# Patient Record
Sex: FEMALE | Race: WHITE | NOT HISPANIC OR LATINO | Employment: FULL TIME | URBAN - NONMETROPOLITAN AREA
[De-identification: names, ages, dates, MRNs, and addresses within clinical notes are randomized per-mention and may not be internally consistent; named-entity substitution may affect disease eponyms.]

---

## 2017-04-07 RX ORDER — DESVENLAFAXINE SUCCINATE 50 MG/1
TABLET, EXTENDED RELEASE ORAL
Qty: 30 TABLET | Refills: 0 | Status: SHIPPED | OUTPATIENT
Start: 2017-04-07 | End: 2017-05-31 | Stop reason: SDUPTHER

## 2017-05-16 RX ORDER — DESVENLAFAXINE SUCCINATE 50 MG/1
TABLET, EXTENDED RELEASE ORAL
Qty: 30 TABLET | Refills: 0 | OUTPATIENT
Start: 2017-05-16

## 2017-05-31 ENCOUNTER — OFFICE VISIT (OUTPATIENT)
Dept: OBSTETRICS AND GYNECOLOGY | Facility: CLINIC | Age: 57
End: 2017-05-31

## 2017-05-31 VITALS
HEIGHT: 69 IN | WEIGHT: 204 LBS | DIASTOLIC BLOOD PRESSURE: 91 MMHG | SYSTOLIC BLOOD PRESSURE: 138 MMHG | BODY MASS INDEX: 30.21 KG/M2 | HEART RATE: 74 BPM

## 2017-05-31 DIAGNOSIS — Z78.0 POSTMENOPAUSAL: ICD-10-CM

## 2017-05-31 DIAGNOSIS — Z79.890 HORMONE REPLACEMENT THERAPY (POSTMENOPAUSAL): ICD-10-CM

## 2017-05-31 DIAGNOSIS — F32.A DEPRESSIVE DISORDER: Primary | ICD-10-CM

## 2017-05-31 PROCEDURE — 99213 OFFICE O/P EST LOW 20 MIN: CPT | Performed by: NURSE PRACTITIONER

## 2017-05-31 RX ORDER — DESVENLAFAXINE SUCCINATE 50 MG/1
50 TABLET, EXTENDED RELEASE ORAL DAILY
Qty: 30 TABLET | Refills: 3 | Status: SHIPPED | OUTPATIENT
Start: 2017-05-31 | End: 2017-07-17 | Stop reason: SDUPTHER

## 2017-07-17 ENCOUNTER — PROCEDURE VISIT (OUTPATIENT)
Dept: OBSTETRICS AND GYNECOLOGY | Facility: CLINIC | Age: 57
End: 2017-07-17

## 2017-07-17 VITALS
BODY MASS INDEX: 30.81 KG/M2 | WEIGHT: 208 LBS | HEIGHT: 69 IN | DIASTOLIC BLOOD PRESSURE: 84 MMHG | SYSTOLIC BLOOD PRESSURE: 129 MMHG

## 2017-07-17 DIAGNOSIS — Z01.419 WELL WOMAN EXAM WITH ROUTINE GYNECOLOGICAL EXAM: Primary | ICD-10-CM

## 2017-07-17 PROCEDURE — 99396 PREV VISIT EST AGE 40-64: CPT | Performed by: OBSTETRICS & GYNECOLOGY

## 2017-07-17 PROCEDURE — 87624 HPV HI-RISK TYP POOLED RSLT: CPT | Performed by: OBSTETRICS & GYNECOLOGY

## 2017-07-17 PROCEDURE — 88142 CYTOPATH C/V THIN LAYER: CPT | Performed by: OBSTETRICS & GYNECOLOGY

## 2017-07-17 RX ORDER — DESVENLAFAXINE SUCCINATE 50 MG/1
50 TABLET, EXTENDED RELEASE ORAL DAILY
Qty: 30 TABLET | Refills: 6 | Status: SHIPPED | OUTPATIENT
Start: 2017-07-17 | End: 2018-08-07

## 2017-07-17 NOTE — PROGRESS NOTES
Subjective     Chief Complaint   Patient presents with   • Gynecologic Exam       Caridad Wallace is a 56 y.o. presents today for annual exam.  Patient had an ablation several years ago and has had no bleeding since.  No pelvic pain or discharge.  Was given Premarin cream last year for vaginal atrophy, stated she used it for a while, but hasn't used it in a long time.  Still sexually active, but states dryness makes it uncomfortable at times.  No issues with incontinence.  Patient would like her Pristiq refilled today.      Last pap was in 2014 (NIL), and last mammogram was in 2016.  Never had a colonoscopy.     Past Medical History:   Diagnosis Date   • Allergic rhinitis    • Benign hypertension    • Cough    • Depressive disorder    • Encounter for gynecological examination (general) (routine) without abnormal findings    • Headache    • Pain in toe    • Rhinitis    • Upper respiratory infection      Past Surgical History:   Procedure Laterality Date   • DILATATION AND CURETTAGE  06/03/2009    D&C & NovaSure endometrial ablation. Menorrhagia & anemia   • ENDOMETRIAL BIOPSY  2008   • INJECTION OF MEDICATION  07/29/2014    Kenalog (1)      • LAPAROSCOPIC CHOLECYSTECTOMY     • PAP SMEAR  06/01/2009    negative   • TONSILLECTOMY  02/11/1979    recurrent tonsillitis   • TUBAL ABDOMINAL LIGATION Bilateral    • VAGINAL DELIVERY      x2     Social History     Social History   • Marital status:      Spouse name: N/A   • Number of children: N/A   • Years of education: N/A     Occupational History   • Not on file.     Social History Main Topics   • Smoking status: Never Smoker   • Smokeless tobacco: Not on file   • Alcohol use No   • Drug use: No   • Sexual activity: Not on file     Other Topics Concern   • Not on file     Social History Narrative     Family History   Problem Relation Age of Onset   • Heart disease Mother      pacemaker   • Congenital heart disease Mother    • No Known Problems Father    • Breast  "cancer Maternal Grandfather    • Hypertension Other    • Breast cancer Maternal Grandmother      Review of Systems - comprehensive ROS preformed and all negative      Objective      /84  Ht 69\" (175.3 cm)  Wt 208 lb (94.3 kg)  BMI 30.72 kg/m2    Physical Exam  General:   Appears stated age, AAOx3, NAD   Heart: RRR no m/r/g   Lungs: CTAB   Breast: Symmetrical, no masses or lumps noted exam bilaterally, no skin retraction or dimpling noted. No nipple discharge.    Neck: No neck fullness, thyroid normal with no nodules noted   Abdomen: Soft, nttp, no masses palpated   Pelvis: External genitalia - NEFG  Urethra - normal appearing, no urethra caruncle or prolapse  Vagina - moderate amount of vaginal atrophy noted, no discharge or bleeding noted, no lesions.   Cervix - Normal appearing cervix, pap preformed.   Uterus - 8 week sized uterus with no CMT  Adenxa - no adnexal fullness or masses noted  Anus - normal appearing, no hemorrhoids   Extremities: No CT or edema bilaterally    Neurologic: CN II - XII grossly intact         Assessment/Plan     ASSESSMENT  1. Well woman exam with routine gynecological exam        PLAN  1. Well woman exam  - Pap with HRHPV preformed today   - Reviewed self breast exams, mammogram today  - GI referral given for colonoscopy  - Refill given for Pristiq, encouraged patient to find a PCP to management depression   - RTC in 1 year for annual exam.     Africa Colindres MD  7/17/2017           "

## 2017-07-25 LAB
LAB AP CASE REPORT: NORMAL
LAB AP GYN ADDITIONAL INFORMATION: NORMAL
Lab: NORMAL
PATH INTERP SPEC-IMP: NORMAL
STAT OF ADQ CVX/VAG CYTO-IMP: NORMAL

## 2017-07-28 LAB — HPV I/H RISK 4 DNA CVX QL PROBE+SIG AMP: NEGATIVE

## 2017-10-10 RX ORDER — DESVENLAFAXINE SUCCINATE 50 MG/1
TABLET, EXTENDED RELEASE ORAL
Qty: 30 TABLET | Refills: 9 | Status: SHIPPED | OUTPATIENT
Start: 2017-10-10 | End: 2018-08-07

## 2020-01-03 DIAGNOSIS — I10 ESSENTIAL HYPERTENSION: ICD-10-CM

## 2020-01-03 RX ORDER — LOSARTAN POTASSIUM 50 MG/1
50 TABLET ORAL DAILY
Qty: 15 TABLET | Refills: 0 | Status: SHIPPED | OUTPATIENT
Start: 2020-01-03

## 2022-01-19 ENCOUNTER — HOSPITAL ENCOUNTER (EMERGENCY)
Facility: HOSPITAL | Age: 62
Discharge: HOME | End: 2022-01-19
Attending: EMERGENCY MEDICINE | Admitting: EMERGENCY MEDICINE

## 2022-01-19 VITALS
HEIGHT: 69 IN | HEART RATE: 86 BPM | DIASTOLIC BLOOD PRESSURE: 86 MMHG | WEIGHT: 204 LBS | OXYGEN SATURATION: 94 % | BODY MASS INDEX: 30.21 KG/M2 | RESPIRATION RATE: 18 BRPM | SYSTOLIC BLOOD PRESSURE: 145 MMHG | TEMPERATURE: 98.7 F

## 2022-01-19 DIAGNOSIS — U07.1 COVID: Primary | ICD-10-CM

## 2022-01-19 DIAGNOSIS — J02.9 PHARYNGITIS, UNSPECIFIED ETIOLOGY: ICD-10-CM

## 2022-01-19 LAB — S PYO AG THROAT QL: NEGATIVE

## 2022-01-19 PROCEDURE — 87081 CULTURE SCREEN ONLY: CPT | Performed by: EMERGENCY MEDICINE

## 2022-01-19 PROCEDURE — 87880 STREP A ASSAY W/OPTIC: CPT | Performed by: EMERGENCY MEDICINE

## 2022-01-19 PROCEDURE — 63710000001 DEXAMETHASONE PER 0.25 MG: Performed by: EMERGENCY MEDICINE

## 2022-01-19 PROCEDURE — 99283 EMERGENCY DEPT VISIT LOW MDM: CPT

## 2022-01-19 RX ORDER — LIDOCAINE HYDROCHLORIDE 20 MG/ML
5 SOLUTION OROPHARYNGEAL ONCE
Status: COMPLETED | OUTPATIENT
Start: 2022-01-19 | End: 2022-01-19

## 2022-01-19 RX ADMIN — DEXAMETHASONE 10 MG: 4 TABLET ORAL at 21:19

## 2022-01-19 RX ADMIN — LIDOCAINE HYDROCHLORIDE 5 ML: 20 SOLUTION ORAL; TOPICAL at 21:20

## 2022-01-20 NOTE — ED PROVIDER NOTES
"Subjective   61-year-old female presents the emergency department with 3 days of symptoms but tested positive yesterday for COVID.  She reports she intermittently has some shortness of breath but her main complaint is her throat which \"feels like it is closing\".  She is not sure if it feels sore but something \"just is not right\".  Concerned she may also have strep throat.  She has taken some Motrin with minimal improvement.  Reports her voice sounds different and she is having difficulty swallowing. Denies difficulty breathing through her throat.     Family history, surgical history, social history, current medications and allergies are reviewed with the patient and triage documentation and vitals are reviewed.      History provided by:  Patient   used: No        Review of Systems   Constitutional: Negative for chills and fever.   HENT: Positive for sore throat, trouble swallowing and voice change. Negative for congestion, sinus pressure and sinus pain.    Eyes: Negative for photophobia and visual disturbance.   Respiratory: Positive for shortness of breath. Negative for cough, chest tightness and wheezing.    Cardiovascular: Negative for chest pain and palpitations.   Gastrointestinal: Negative for abdominal pain, diarrhea, nausea and vomiting.   Endocrine: Negative.    Genitourinary: Negative for dysuria, frequency and urgency.   Musculoskeletal: Negative for arthralgias, back pain, myalgias and neck pain.   Skin: Negative for rash and wound.   Allergic/Immunologic: Negative.    Neurological: Negative.    Hematological: Negative.    Psychiatric/Behavioral: Negative.        Past Medical History:   Diagnosis Date   • Allergic rhinitis    • Benign hypertension    • Cough    • COVID    • Depressive disorder    • Encounter for gynecological examination (general) (routine) without abnormal findings    • Headache    • Pain in toe    • Rhinitis    • Upper respiratory infection        Allergies "   Allergen Reactions   • Sulfa Antibiotics Rash       Past Surgical History:   Procedure Laterality Date   • DILATATION AND CURETTAGE  06/03/2009    D&C & NovaSure endometrial ablation. Menorrhagia & anemia   • ENDOMETRIAL BIOPSY  2008   • INJECTION OF MEDICATION  07/29/2014    Kenalog (1)      • LAPAROSCOPIC CHOLECYSTECTOMY     • PAP SMEAR  06/01/2009    negative   • TONSILLECTOMY  02/11/1979    recurrent tonsillitis   • TUBAL ABDOMINAL LIGATION Bilateral    • VAGINAL DELIVERY      x2       Family History   Problem Relation Age of Onset   • Heart disease Mother         pacemaker   • Congenital heart disease Mother    • No Known Problems Father    • Breast cancer Maternal Grandfather    • Hypertension Other    • Breast cancer Maternal Grandmother        Social History     Socioeconomic History   • Marital status:    Tobacco Use   • Smoking status: Never Smoker   • Smokeless tobacco: Never Used   Substance and Sexual Activity   • Alcohol use: No   • Drug use: No           Objective   Physical Exam  Vitals and nursing note reviewed.   Constitutional:       General: She is not in acute distress.     Appearance: She is well-developed. She is obese. She is not ill-appearing, toxic-appearing or diaphoretic.   HENT:      Head: Normocephalic.      Mouth/Throat:      Mouth: Mucous membranes are moist.      Pharynx: Oropharynx is clear. Posterior oropharyngeal erythema present. No oropharyngeal exudate.      Tonsils: No tonsillar exudate or tonsillar abscesses. 1+ on the right. 1+ on the left.   Eyes:      Conjunctiva/sclera: Conjunctivae normal.      Pupils: Pupils are equal, round, and reactive to light.   Cardiovascular:      Rate and Rhythm: Normal rate and regular rhythm.      Heart sounds: Normal heart sounds. No murmur heard.      Pulmonary:      Effort: Pulmonary effort is normal. No respiratory distress.      Breath sounds: Normal breath sounds. No wheezing or rhonchi.   Abdominal:      General: Bowel sounds  are normal.      Palpations: Abdomen is soft.   Skin:     General: Skin is warm and dry.      Capillary Refill: Capillary refill takes less than 2 seconds.   Neurological:      Mental Status: She is alert and oriented to person, place, and time.   Psychiatric:         Mood and Affect: Mood is anxious.         Behavior: Behavior normal.         Procedures  none         ED Course      Labs Reviewed   RAPID STREP A SCREEN - Normal   BETA HEMOLYTIC STREP CULTURE, THROAT     No results found.          MDM  Number of Diagnoses or Management Options     Amount and/or Complexity of Data Reviewed  Clinical lab tests: reviewed    Patient Progress  Patient progress: stable    Rapid strep negative.  Sent for culture.  Patient feels slightly better with medications in the emergency department.  Maintaining airway.  Advised on symptomatic treatment and reasons to return to the emergency department.  Likely related to her COVID illness.    Final diagnoses:   COVID   Pharyngitis, unspecified etiology         ED Disposition  ED Disposition     ED Disposition Condition Comment    Discharge Stable           [facility] EMERGENCY DEPARTMENT  [address] [place] [id number] [phone]    If symptoms worsen         Medication List      No changes were made to your prescriptions during this visit.          Tobias Barton,   01/19/22 1506

## 2022-01-21 LAB — BACTERIA SPEC AEROBE CULT: NORMAL

## 2023-01-01 PROBLEM — I10 ESSENTIAL HYPERTENSION: Status: ACTIVE | Noted: 2020-01-21

## 2023-01-01 PROBLEM — B35.1 DERMATOPHYTOSIS, NAIL: Status: ACTIVE | Noted: 2020-01-21
